# Patient Record
(demographics unavailable — no encounter records)

---

## 2024-10-07 NOTE — PROCEDURE
[FreeTextEntry3] : Large joint injection was performed bilaterally of the knee. The indication for this procedure was pain, inflammation and x-ray evidence of Osteoarthritis on this or prior visit. The site was prepped with betadine, ethyl chloride sprayed topically and sterile technique used. An injection of 30mg of Orthovisc, series #2 was used.  Patient tolerated procedure well. Patient was advised to call if redness, pain or fever occur.    The risks benefits, and alternatives have been discussed, and verbal consent was obtained.

## 2024-11-25 NOTE — HISTORY OF PRESENT ILLNESS
[de-identified] : 11/25/2024: Patient is here for a follow up on bilateral knee pain. Patient had Gel injections 6 weeks ago with no relief.    09/30/24: Patient is here for bilateral knee pain. Patient reports she has had pain for years.  She notes Right worse than Left .  She denies any recent injury. She notes most pain medial aspect of the knees.  She has difficulty with ambulating/stairs  She has never had CSI or lubricant injections.

## 2024-11-25 NOTE — PHYSICAL EXAM
[de-identified] : Constitutional: The patient appears well developed, well nourished. Examination of patients ability to communicate functionally was normal.       Neurologic: Coordination is normal. Alert and oriented to time, place and person. No evidence of mood disorder, calm affect.        B/L     KNEE  : Inspection of the knee is as follows: moderate effusion. no ecchymosis, no streaking, no erythema, no atrophy, no deformities of the quad tendon and no deformities of patellar tendon.     VARUS ALIGNMENT  RIGHT GREATER THAN LEFT LLE EDEMA WITH SIGNS OF PVD BUT NO INFECTION   Palpation of the knee is as follows: medial joint line tenderness, lateral joint line tenderness, medial facet of patella tenderness, lateral facet of patella tenderness and crepitus. no palpable masses and no increased warmth.       Knee Range of Motion is as follows in degrees:        Extension: 0    Flexion: 95        Strength examination of the knee is as follows:    Quadriceps strength is 5/5    Hamstring strength is 5/5       Ligament Stability and Special Test ligamentously stable, negative anterior draw, negative Lachman test, negative posterior draw and no varus or valgus instability.    negative McMurrays test and able to do active straight leg raise without an extensor lag.       Neurological examination of the knee is as follows: light touch is intact throughout.       Gait and function is as follows: mildly antalgic gait.

## 2024-11-25 NOTE — DISCUSSION/SUMMARY
[de-identified] :  NOAM BATES 78 year F was seen and evaluated in office today. Following evaluation, the natural history of the pathology was explained in full to the patient in layman's terms.   Several different treatment options were discussed and explained in full to the patient, along with specific risks and benefits of both surgical and non-surgical treatments. Nonsurgical options including but not limited to Corticosteroid Injection, Visco supplementation, Prescription anti-inflammatory medications (both steroidal and non-steroidal), activity modification, non-impact exercise, maintaining a healthy BMI, bracing, and icing were all reviewed. Surgical options including but not limited to arthroscopy, and joint replacement along with other indicated procedures were discussed.   Discussed risk of morbidity associated with proposed treatment plans. These risks include, but are not limited to, the risk associated with prescription strength medications and possible side effects of these medications which include GI hemorrhage with oral medications along with cardiac/renal issues with long term use. Risks with all pertinent surgical interventions as discussed with patient including infection, bleeding, anesthesia complications, NV injury, fracture, DVT, or heterotopic ossification.   Furthermore, discussed with NOAM that they could also delay any immediate treatment options and continue to observe and self-care for the discussed problem. Discussed Home Exercise Programs as well as Rest, Ice and elevation. Patient had ample time to ask any questions about todays visit and the diagnosis, and all questions were answered. Patient understands the plan going forward.   Lastly, based on this patient's presentation at our office, which is an orthopedic specialist office, this patient inherently / intrinsically has a risk of progression of symptoms/condition, as well as development and/or exacerbation of cardiovascular disease/conditions, obesity, DVT, worsening and chronic pain, and permanent loss of function, as well as decreased abilities to complete activities of daily life.  PLAN:  At this time the patient was advised on weight management. Discussed that healthy lifestyle choices including dieting and exercise can promote healthy weight management.   The patient has been informed they need to get their BMI under 40 to be a candidate for the procedure. They understand that having a BMI over 40 puts them at high risk for infection. They understand that if they get an infection in bone, it can be there for life. The importance of addressing weight loss has been stressed and the patient's questions were answered.   She wants to try CSI  Patient understands if cortisone injection is administered, total joint arthroplasty will need to be delayed 4 months.  Patient was given a CSI to the RIGHT KNEE via the superomedial portal, advised to ice if sore and will observe over the next 24 hours for any redness, swelling or increased discomfort. The indication for this procedure was pain and inflammation. The site was prepped with betadine and sterile technique used. An injection of Lidocaine 5cc of 1% was used Betamethasone (Celestone) 1cc of 6mg.  The patient tolerated procedure well. They were advised to call if redness, pain or fever occur and apply ice for 15 minutes out of every hour for the next 12-24 hours as tolerated. The risks benefits, and alternatives have been discussed. These risks include infection, hemarthrosis, allergic reaction, bleeding and hyperglycemia. Verbal understanding and consent was obtained. There is a moderate risk of morbidity with further treatment, especially from use of prescription strength medication.   The patient was invited simi the TJS  f/u 1 month  Entered by Sancho Paz acting as scribe. Dr. Santana Attestation The documentation recorded by the scribe, in my presence, accurately reflects the service I, Dr. Santana, personally performed, and the decisions made by me with my edits as appropriate.